# Patient Record
Sex: FEMALE | Race: WHITE | NOT HISPANIC OR LATINO | URBAN - METROPOLITAN AREA
[De-identification: names, ages, dates, MRNs, and addresses within clinical notes are randomized per-mention and may not be internally consistent; named-entity substitution may affect disease eponyms.]

---

## 2017-01-12 ENCOUNTER — INFUSION - HEALTHEAST (OUTPATIENT)
Dept: INFUSION THERAPY | Facility: CLINIC | Age: 52
End: 2017-01-12

## 2017-01-12 DIAGNOSIS — M35.9 POLYMYOSITIS ASSOCIATED WITH CONNECTIVE TISSUE DISORDER (H): ICD-10-CM

## 2017-01-12 DIAGNOSIS — M33.20 POLYMYOSITIS ASSOCIATED WITH CONNECTIVE TISSUE DISORDER (H): ICD-10-CM

## 2017-01-26 ENCOUNTER — INFUSION - HEALTHEAST (OUTPATIENT)
Dept: INFUSION THERAPY | Facility: CLINIC | Age: 52
End: 2017-01-26

## 2017-01-26 DIAGNOSIS — M33.20 POLYMYOSITIS ASSOCIATED WITH CONNECTIVE TISSUE DISORDER (H): ICD-10-CM

## 2017-01-26 DIAGNOSIS — M35.9 POLYMYOSITIS ASSOCIATED WITH CONNECTIVE TISSUE DISORDER (H): ICD-10-CM

## 2017-02-16 ENCOUNTER — INFUSION - HEALTHEAST (OUTPATIENT)
Dept: INFUSION THERAPY | Facility: CLINIC | Age: 52
End: 2017-02-16

## 2017-02-16 DIAGNOSIS — M35.9 POLYMYOSITIS ASSOCIATED WITH CONNECTIVE TISSUE DISORDER (H): ICD-10-CM

## 2017-02-16 DIAGNOSIS — M33.20 POLYMYOSITIS ASSOCIATED WITH CONNECTIVE TISSUE DISORDER (H): ICD-10-CM

## 2017-02-16 LAB
ALT SERPL W P-5'-P-CCNC: 105 U/L (ref 0–45)
AST SERPL W P-5'-P-CCNC: 91 U/L (ref 0–40)
CREAT SERPL-MCNC: 0.64 MG/DL (ref 0.6–1.1)
GFR SERPL CREATININE-BSD FRML MDRD: >60 ML/MIN/1.73M2

## 2017-03-15 ENCOUNTER — INFUSION - HEALTHEAST (OUTPATIENT)
Dept: INFUSION THERAPY | Facility: CLINIC | Age: 52
End: 2017-03-15

## 2017-03-15 DIAGNOSIS — M35.9 POLYMYOSITIS ASSOCIATED WITH CONNECTIVE TISSUE DISORDER (H): ICD-10-CM

## 2017-03-15 DIAGNOSIS — M33.20 POLYMYOSITIS ASSOCIATED WITH CONNECTIVE TISSUE DISORDER (H): ICD-10-CM

## 2017-04-20 ENCOUNTER — INFUSION - HEALTHEAST (OUTPATIENT)
Dept: INFUSION THERAPY | Facility: CLINIC | Age: 52
End: 2017-04-20

## 2017-04-20 DIAGNOSIS — M33.20 POLYMYOSITIS ASSOCIATED WITH CONNECTIVE TISSUE DISORDER (H): ICD-10-CM

## 2017-04-20 DIAGNOSIS — M35.9 POLYMYOSITIS ASSOCIATED WITH CONNECTIVE TISSUE DISORDER (H): ICD-10-CM

## 2017-05-17 ENCOUNTER — INFUSION - HEALTHEAST (OUTPATIENT)
Dept: INFUSION THERAPY | Facility: CLINIC | Age: 52
End: 2017-05-17

## 2017-05-17 DIAGNOSIS — M33.20 POLYMYOSITIS ASSOCIATED WITH CONNECTIVE TISSUE DISORDER (H): ICD-10-CM

## 2017-05-17 DIAGNOSIS — M35.9 POLYMYOSITIS ASSOCIATED WITH CONNECTIVE TISSUE DISORDER (H): ICD-10-CM

## 2017-05-17 LAB
ALT SERPL W P-5'-P-CCNC: 133 U/L (ref 0–45)
AST SERPL W P-5'-P-CCNC: 111 U/L (ref 0–40)
CREAT SERPL-MCNC: 0.68 MG/DL (ref 0.6–1.1)
GFR SERPL CREATININE-BSD FRML MDRD: >60 ML/MIN/1.73M2

## 2017-06-01 ENCOUNTER — INFUSION - HEALTHEAST (OUTPATIENT)
Dept: INFUSION THERAPY | Facility: CLINIC | Age: 52
End: 2017-06-01

## 2017-06-01 DIAGNOSIS — M33.20 POLYMYOSITIS ASSOCIATED WITH CONNECTIVE TISSUE DISORDER (H): ICD-10-CM

## 2017-06-01 DIAGNOSIS — M35.9 POLYMYOSITIS ASSOCIATED WITH CONNECTIVE TISSUE DISORDER (H): ICD-10-CM

## 2021-05-30 VITALS — WEIGHT: 139.4 LBS | BODY MASS INDEX: 21.2 KG/M2

## 2021-05-30 VITALS — WEIGHT: 134.6 LBS | BODY MASS INDEX: 20.47 KG/M2

## 2021-05-30 VITALS — WEIGHT: 141 LBS | BODY MASS INDEX: 21.44 KG/M2

## 2021-05-30 VITALS — BODY MASS INDEX: 21.41 KG/M2 | WEIGHT: 140.8 LBS

## 2021-05-30 VITALS — BODY MASS INDEX: 20.77 KG/M2 | WEIGHT: 136.6 LBS

## 2021-05-30 VITALS — BODY MASS INDEX: 22 KG/M2 | WEIGHT: 144.7 LBS

## 2021-05-30 VITALS — BODY MASS INDEX: 21.58 KG/M2 | WEIGHT: 141.9 LBS

## 2021-06-08 NOTE — PROGRESS NOTES
Pt took pepcid at home prior and only requested 25mg benedryl po as a premed .  No c/o's with infusion . vss

## 2021-06-10 NOTE — PROGRESS NOTES
Pt admitted per evelyn for her regularly scheduled ivig States she is overdue and can tell on her body as she feels achy and more fatigued. Talkative about her recent trip in [place] for the wedding which her daughter Viviana was a bridesmaid ad gave a toast. Had a wonderful time at resort. IV started and received ivig as ordered.

## 2021-09-16 PROCEDURE — 88305 TISSUE EXAM BY PATHOLOGIST: CPT | Mod: TC,ORL | Performed by: PLASTIC SURGERY

## 2021-09-17 ENCOUNTER — LAB REQUISITION (OUTPATIENT)
Dept: LAB | Facility: CLINIC | Age: 56
End: 2021-09-17
Payer: COMMERCIAL

## 2021-09-20 LAB
PATH REPORT.COMMENTS IMP SPEC: NORMAL
PATH REPORT.COMMENTS IMP SPEC: NORMAL
PATH REPORT.FINAL DX SPEC: NORMAL
PATH REPORT.GROSS SPEC: NORMAL
PATH REPORT.MICROSCOPIC SPEC OTHER STN: NORMAL
PATH REPORT.RELEVANT HX SPEC: NORMAL
PHOTO IMAGE: NORMAL

## 2021-09-20 PROCEDURE — 88305 TISSUE EXAM BY PATHOLOGIST: CPT | Mod: 26 | Performed by: PATHOLOGY

## 2021-10-24 ENCOUNTER — HEALTH MAINTENANCE LETTER (OUTPATIENT)
Age: 56
End: 2021-10-24

## 2022-10-15 ENCOUNTER — HEALTH MAINTENANCE LETTER (OUTPATIENT)
Age: 57
End: 2022-10-15

## 2022-11-27 ENCOUNTER — HEALTH MAINTENANCE LETTER (OUTPATIENT)
Age: 57
End: 2022-11-27

## 2023-10-29 ENCOUNTER — HEALTH MAINTENANCE LETTER (OUTPATIENT)
Age: 58
End: 2023-10-29